# Patient Record
Sex: FEMALE | Race: WHITE
[De-identification: names, ages, dates, MRNs, and addresses within clinical notes are randomized per-mention and may not be internally consistent; named-entity substitution may affect disease eponyms.]

---

## 2019-10-31 ENCOUNTER — HOSPITAL ENCOUNTER (OUTPATIENT)
Dept: HOSPITAL 35 - OR | Age: 74
Discharge: HOME | End: 2019-10-31
Attending: OPHTHALMOLOGY
Payer: COMMERCIAL

## 2019-10-31 VITALS — DIASTOLIC BLOOD PRESSURE: 52 MMHG | SYSTOLIC BLOOD PRESSURE: 110 MMHG

## 2019-10-31 VITALS — HEIGHT: 65 IN | BODY MASS INDEX: 35.65 KG/M2 | WEIGHT: 214 LBS

## 2019-10-31 DIAGNOSIS — Z79.899: ICD-10-CM

## 2019-10-31 DIAGNOSIS — Z90.49: ICD-10-CM

## 2019-10-31 DIAGNOSIS — Z86.69: ICD-10-CM

## 2019-10-31 DIAGNOSIS — I10: ICD-10-CM

## 2019-10-31 DIAGNOSIS — Z98.41: ICD-10-CM

## 2019-10-31 DIAGNOSIS — Z90.710: ICD-10-CM

## 2019-10-31 DIAGNOSIS — H11.242: ICD-10-CM

## 2019-10-31 DIAGNOSIS — E11.9: ICD-10-CM

## 2019-10-31 DIAGNOSIS — E78.5: ICD-10-CM

## 2019-10-31 DIAGNOSIS — K21.9: ICD-10-CM

## 2019-10-31 DIAGNOSIS — F17.210: ICD-10-CM

## 2019-10-31 DIAGNOSIS — Z98.42: ICD-10-CM

## 2019-10-31 DIAGNOSIS — H54.40: Primary | ICD-10-CM

## 2019-10-31 DIAGNOSIS — Z98.890: ICD-10-CM

## 2019-10-31 PROCEDURE — 70005: CPT

## 2019-10-31 PROCEDURE — 56528: CPT

## 2019-10-31 PROCEDURE — 56527: CPT

## 2019-10-31 PROCEDURE — 51636: CPT

## 2019-10-31 PROCEDURE — 50010 RENAL EXPLORATION: CPT

## 2019-10-31 PROCEDURE — 62110: CPT

## 2019-10-31 PROCEDURE — 50398: CPT

## 2019-10-31 PROCEDURE — 56531: CPT

## 2019-10-31 PROCEDURE — 62900: CPT

## 2019-10-31 PROCEDURE — 50386 REMOVE STENT VIA TRANSURETH: CPT

## 2019-10-31 PROCEDURE — 53500 URETHRLYS TRANSVAG W/ SCOPE: CPT

## 2019-10-31 PROCEDURE — 51854: CPT

## 2019-10-31 PROCEDURE — 50101: CPT

## 2019-11-04 NOTE — PATH
[facility]
[address]
[place]   [address]                     PATHOLOGY RPT PROCEDURE       
_______________________________________________________________________________
 
Name:       CHACHA LUCERO               [place]                     [facility] 
M.R.#:      [id number]     Account #:      [id number]  
Admission:  10/31/19    Date of Birth:  [birth year]  
Discharge:                                              Report #:    [id number]
                                                        Path Case #: [id number]
_______________________________________________________________________________
 
[facility] Accession Number: [id number]
.                                                                01
Material submitted:                                        .
eye - LEFT EYE,SUTURE MARKS LATERAL RECTUS INSERTION. Modifiers: left
.                                                                01
Clinical history:                                          .
Blind painful eye.
.                                                                02
**********************************************************************
Diagnosis:
Eye, left eye, enucleation:
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
anterior chamber.
- Fibrosis, dystrophic calcification along with bone formation of the
posterior chamber, history of blind painful eye.
(IUV:pit; 11/04/2019)
QTP  11/04/2019  1508 Local
**********************************************************************
.                                                                02
Electronically signed:                                     .
Marcela Quintero MD, Pathologist
NPI- [id number]
.                                                                01
Gross description:                                         .
Received in formalin labeled "Chacha Lucero, left eye" and oriented on
the problem specimen form as "suture marks lateral rectus insertion" is an
eye enucleation specimen measuring 2.4 x 2.4 x 2.2 cm.  There is a scant
amount of optic nerve present.  The specimen is serially sectioned from
lateral to medial to reveal hemorrhagic globe contents without definitive
lesions.  The specimen is entirely submitted sequentially from lateral to
medial in cassettes A1-A6. ([facility]; 11/3/2019)
[facility]  11/03/2019  0956 Local
.                                                                02
Pathologist provided ICD-10:
Z86.69
.                                                                02
CPT                                                        .
904574
Specimen Comment: A courtesy copy of this report has been sent to [phone]
Specimen Comment: Report sent to 
***Performed at:  01
   [address]  [id number]
   MD Anish Mahoney MD Phone:  [phone]
***Performed at:  02
   [address]  [id number]
 
 
[address]   [address]                     PATHOLOGY RPT PROCEDURE       
_______________________________________________________________________________
 
Name:       CHACHA LUCERO               [place] #[place]                     [facility] [place] 
M.R.#:      [id number]     Account #:      [id number]  
Admission:  10/31/19    Date of Birth:  [birth year]  
Discharge:                                              Report #:    [id number]
                                                        Path Case #: [id number]
_______________________________________________________________________________
   MD Marcela Quintero MD Phone:  [phone]

## 2019-11-04 NOTE — O
[facility]
[place]   [address]                     OPERATIVE REPORT              
_______________________________________________________________________________
 
Name:       SONDRA LUCERO               Room #:                     [facility].#:      [id number]                       Account #:      [id number]  
Admission:  10/31/19    Attend Phys:    Eusebio Gabriel MD  
Discharge:              Date of Birth:  [birth year]  
                                                          Report #: [id number]
                                                                    [id number]   
_______________________________________________________________________________
THIS REPORT FOR:   //name//                          
 
CC: JOSEPH CARTER 
    [facility] physician/PCP
    Eusebio Gabriel
 
DATE OF SERVICE:  10/31/2019
 
 
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
 
PROCEDURE:  Enucleation of left eye with implantation of 18 mm Medpor sphere
with muscles attached, conjunctivoplasty, temporary tarsorrhaphy.
 
SURGEON:  Eusebio Gabriel MD
 
ASSISTANT:  None.
 
ANESTHESIA:  General.
 
COMPLICATIONS:  None.
 
INDICATIONS FOR SURGERY:  This pleasant 74-year-old woman has a blind painful,
irreversibly blinded left eye.  She presents today for removal of the eye for
pain control with reconstruction of the socket.  Informed consent was obtained
to include but not limited to the potential risk for loss of vision, bleeding,
infection, failure to improve the problem, and the potential need for further
surgery or treatment.
 
DESCRIPTION OF PROCEDURE:  The patient was taken to the operating room where
general anesthesia was administered.  The left socket was then anesthetized with
Xylocaine with epinephrine mixed with Marcaine and Wydase.  An aliquot was
administered in the retrobulbar space, an aliquot was administered
subconjunctivally anteriorly, and an aliquot was administered on the left upper
and lower lid margins anticipating a temporary tarsorrhaphy.  The patient was
subsequently prepped and draped in the usual sterile fashion.  A moistened
sponge was placed on the right eye, following which a lid speculum was placed on
the left.  A 360-degree conjunctival peritomy was undertaken.  It was most
challenging laterally in the area of a prior surgical event.  As much of the
conjunctiva, however, that could be salvaged as it was  from the
underlying episclera and globe.  The oblique quadrants were then bluntly
dissected with a Ángel scissor.  The lateral rectus muscle was then isolated
on a muscle hook and cleaned of its surrounding connective tissue.  A 5-0 Vicryl
 
 
 
[address]   [address]                     OPERATIVE REPORT              
_______________________________________________________________________________
 
Name:       SONDRA LUCERO KASHIF               Room #:                     [facility].R.#:      [id number]                       Account #:      [id number]  
Admission:  10/31/19    Attend Phys:    Eusebio Gabriel MD  
Discharge:              Date of Birth:  [birth year]  
                                                          Report #: [id number]
                                                                    [id number]   
_______________________________________________________________________________
suture was then passed through its insertion with locking bites at each margin. 
The muscle was then transected from the globe and then dissected back into the
orbit.
 
The inferior medial and superior rectus muscles were similarly isolated and
tagged with 5-0 Vicryl sutures.  The oblique muscles were isolated and just cut
from their attachment to the globe.
 
The optic nerve was then clamped with a hemostat for 3 minutes.  It was released
and then reclamped for 3 more minutes.  It was then released and clamped one
more time for 3 minutes.  The optic nerve was then cut following which minimal
bleeding ensued.  A 20 mm sizing sphere just barely fit within posterior tenons,
so the decision was made to use an 18 mm implant.  An 18 mm Medpor sphere was
then vacuum aspirated in antibiotic irrigation solution and subsequently
reposited behind posterior tenons with an easy glide introducer.  Posterior
tenons were then closed over the implant with interrupted buried 5-0 Vicryl
sutures.  The muscles were then attached to the implant with interrupted 5-0
Vicryl sutures drawing the medial and lateral rectus muscles to each other and
then the superior and inferior rectus muscles to the medial and lateral rectus
muscles.  Anterior tenons were then closed with interrupted 5-0 Vicryl sutures.
 
The conjunctiva was then deficient to close at this point.  A conjunctivoplasty
was performed mobilizing tissue from the superotemporal bulbar conjunctivae. 
Hemostasis was then re-achieved.  The conjunctiva was subsequently rotated into
position and secured with a running 6-0 Vicryl suture.  Erythromycin ointment
was then instilled in the socket followed by medium size conformer.  A temporary
tarsorrhaphy was then fashioned from a short section of IV tubing and a double
armed 5-0 nylon suture passed.  The eye was then dressed with Telfa pad followed
by 2 eye patches were held in place with silk tape and Mastisol.  The patient
was subsequently transported to the recovery area having tolerated the procedure
well with no anesthetic or operative complications being noted.
 
 
 
 
 
 
 
 
 
 
 
 
 
  <ELECTRONICALLY SIGNED>
   By: Eusebio Gabriel MD          
  11/04/19     0618
D: 10/31/19 1058                           _____________________________________
T: 10/31/19 1136                           Eusebio Gabriel MD            /nt

## 2020-08-04 ENCOUNTER — HOSPITAL ENCOUNTER (OUTPATIENT)
Dept: HOSPITAL 35 - MRI | Age: 75
End: 2020-08-04
Attending: OPHTHALMOLOGY
Payer: COMMERCIAL

## 2020-08-04 DIAGNOSIS — H57.12: ICD-10-CM

## 2020-08-04 DIAGNOSIS — I67.89: Primary | ICD-10-CM

## 2020-08-04 DIAGNOSIS — Z90.01: ICD-10-CM

## 2020-08-04 LAB
BUN SERPL-MCNC: 22 MG/DL (ref 7–18)
CREAT SERPL-MCNC: 0.8 MG/DL (ref 0.6–1)